# Patient Record
Sex: MALE | Race: BLACK OR AFRICAN AMERICAN | Employment: UNEMPLOYED | URBAN - METROPOLITAN AREA
[De-identification: names, ages, dates, MRNs, and addresses within clinical notes are randomized per-mention and may not be internally consistent; named-entity substitution may affect disease eponyms.]

---

## 2021-06-18 ENCOUNTER — HOSPITAL ENCOUNTER (EMERGENCY)
Age: 31
Discharge: HOME | End: 2021-06-18
Attending: EMERGENCY MEDICINE

## 2021-06-18 ENCOUNTER — APPOINTMENT (OUTPATIENT)
Dept: GENERAL RADIOLOGY | Age: 31
End: 2021-06-18
Attending: EMERGENCY MEDICINE

## 2021-06-18 VITALS
WEIGHT: 260 LBS | SYSTOLIC BLOOD PRESSURE: 148 MMHG | TEMPERATURE: 98.8 F | HEIGHT: 68 IN | OXYGEN SATURATION: 97 % | RESPIRATION RATE: 17 BRPM | BODY MASS INDEX: 39.4 KG/M2 | DIASTOLIC BLOOD PRESSURE: 93 MMHG | HEART RATE: 76 BPM

## 2021-06-18 DIAGNOSIS — M25.532 LEFT WRIST PAIN: Primary | ICD-10-CM

## 2021-06-18 PROCEDURE — 73110 X-RAY EXAM OF WRIST: CPT

## 2021-06-18 PROCEDURE — 74011250637 HC RX REV CODE- 250/637: Performed by: EMERGENCY MEDICINE

## 2021-06-18 PROCEDURE — 99283 EMERGENCY DEPT VISIT LOW MDM: CPT

## 2021-06-18 RX ORDER — IBUPROFEN 400 MG/1
800 TABLET ORAL
Status: COMPLETED | OUTPATIENT
Start: 2021-06-18 | End: 2021-06-18

## 2021-06-18 RX ORDER — IBUPROFEN 600 MG/1
600 TABLET ORAL
Qty: 18 TABLET | Refills: 0 | Status: SHIPPED | OUTPATIENT
Start: 2021-06-18

## 2021-06-18 RX ADMIN — IBUPROFEN 800 MG: 400 TABLET ORAL at 20:22

## 2021-06-18 NOTE — ED TRIAGE NOTES
Pt reports injuring his left wrist at work 3-4 weeks ago and being treated by his work with biofreeze, he reports there is still pain and swelling to date.

## 2021-06-19 NOTE — ED NOTES
I have reviewed discharge instructions with the patient. The patient verbalized understanding. Patient armband removed and given to patient to take home. Patient was informed of the privacy risks if armband lost or stolen  Current Discharge Medication List      START taking these medications    Details   ibuprofen (MOTRIN) 600 mg tablet Take 1 Tablet by mouth every six (6) hours as needed for Pain.   Qty: 18 Tablet, Refills: 0  Start date: 6/18/2021

## 2021-06-19 NOTE — ED PROVIDER NOTES
Pt c/o left wrist dennis, x 3-4 weeks, after using arm to push a cart. No direct injury. Dennis continues since, using ice/heat and biofreeze otc cream per pt, but not helping. No redness or warmth. Can move wrist but has pain. No weakness or numbness. No hand pain. No other area of arm dennis. No ortho f/u. History reviewed. No pertinent past medical history. History reviewed. No pertinent surgical history. History reviewed. No pertinent family history. Social History     Socioeconomic History    Marital status: SINGLE     Spouse name: Not on file    Number of children: Not on file    Years of education: Not on file    Highest education level: Not on file   Occupational History    Not on file   Tobacco Use    Smoking status: Never Smoker    Smokeless tobacco: Never Used   Substance and Sexual Activity    Alcohol use: Never    Drug use: Never    Sexual activity: Not on file   Other Topics Concern    Not on file   Social History Narrative    Not on file     Social Determinants of Health     Financial Resource Strain:     Difficulty of Paying Living Expenses:    Food Insecurity:     Worried About Running Out of Food in the Last Year:     [address] in the Last Year:    Transportation Needs:     Lack of Transportation (Medical):  Lack of Transportation (Non-Medical):    Physical Activity:     Days of Exercise per Week:     Minutes of Exercise per Session:    Stress:     Feeling of Stress :    Social Connections:     Frequency of Communication with Friends and Family:     Frequency of Social Gatherings with Friends and Family:     Attends [employer] Services:     Active Member of Clubs or Organizations:     Attends Club or Organization Meetings:     Marital Status:    Intimate Partner Violence:     Fear of Current or Ex-Partner:     Emotionally Abused:     Physically Abused:     Sexually Abused: ALLERGIES: Patient has no known allergies.     Review of Systems   Constitutional: Negative for fever. Musculoskeletal: Positive for arthralgias. Skin: Negative for wound. Neurological: Negative for weakness and numbness. All other systems reviewed and are negative. Vitals:    06/18/21 1933   BP: (!) 148/93   Pulse: 76   Resp: 17   Temp: 98.8 °F (37.1 °C)   SpO2: 97%   Weight: 117.9 kg (260 lb)   Height: 5' 8\" (1.727 m)            Physical Exam  Vitals and nursing note reviewed. Constitutional:       Appearance: He is well-developed. HENT:      Head: Normocephalic and atraumatic. Eyes:      Conjunctiva/sclera: Conjunctivae normal.   Cardiovascular:      Rate and Rhythm: Normal rate and regular rhythm. Pulmonary:      Effort: Pulmonary effort is normal.      Breath sounds: No wheezing. Abdominal:      Palpations: Abdomen is soft. Tenderness: There is no abdominal tenderness. Musculoskeletal:         General: Tenderness (+ ttp left lateral wrist.  from. nvi. no other arm ttp. no swelling/erythema) present. Cervical back: Normal range of motion. Skin:     General: Skin is warm and dry. Capillary Refill: Capillary refill takes less than 2 seconds. Findings: No rash. Neurological:      Mental Status: He is alert and oriented to person, place, and time. Sensory: No sensory deficit. Motor: No weakness. Psychiatric:         Mood and Affect: Mood normal.          MDM       Procedures      Vitals:  Patient Vitals for the past 12 hrs:   Temp Pulse Resp BP SpO2   06/18/21 1933 98.8 °F (37.1 °C) 76 17 (!) 148/93 97 %         Medications ordered:   Medications   ibuprofen (MOTRIN) tablet 800 mg (has no administration in time range)         Lab findings:  No results found for this or any previous visit (from the past 12 hour(s)). X-Ray, CT or other radiology findings or impressions:  XR WRIST LT AP/LAT/OBL MIN 3V    (Results Pending)       Progress notes, Consult notes or additional Procedure notes:   No fx, nvi.  No emc. No s/o infection. Ortho referral given. Min htn, pt to f/u for recheck. pcp referral given. Diagnosis:   1. Left wrist pain        Disposition: home    Follow-up Information     Follow up With Specialties Details Why Contact Info    Jyoti Tavarez MD Orthopedic Surgery Schedule an appointment as soon as possible for a visit in 3 days  [id number] [facility]      [id number] [facility] EMERGENCY DEPT Emergency Medicine Go to  As needed [address] [place] [id number]  [address] [facility]  Schedule an appointment as soon as possible for a visit in 2 days or your family physician Chavez R[address]  [address]  [phone]           Patient's Medications   Start Taking    IBUPROFEN (MOTRIN) 600 MG TABLET    Take 1 Tablet by mouth every six (6) hours as needed for Pain.    Continue Taking    No medications on file   These Medications have changed    No medications on file   Stop Taking    No medications on file

## 2021-06-19 NOTE — DISCHARGE INSTRUCTIONS
Return for pain, swelling/redness, fever not resolving with motrin or tylenol, shortness of breath, vomiting, decreased fluid intake, weakness, numbness, dizziness, or any change or concerns.